# Patient Record
(demographics unavailable — no encounter records)

---

## 2024-11-18 NOTE — HISTORY OF PRESENT ILLNESS
[de-identified] : 62F h/o HTN, HLD, s/p AVR, presents now for evaluation of periumbilical bulge that she first noticed in March of this year. s/p previous lap hysterectomy and lap matues (2023), both using periumbilical incisions. Reports increasing discomfort at site. No episodes of incarceration or bowel obstructions. Pain is worse with straining and sitting up. No tobacco use. No chronic NSAID use.

## 2024-11-18 NOTE — ASSESSMENT
[FreeTextEntry1] : 62F h/o HTN, HLD, s/p AVR, now with periumbilical hernia within rectus diastasis s/p previous lap hysterectomy and mateus. Recommend robotic eTEP repair to address both concomitantly. Will need cardiac eval prior to surgery.

## 2024-11-18 NOTE — PHYSICAL EXAM
[JVD] : no jugular venous distention  [Normal Breath Sounds] : Normal breath sounds [Normal Heart Sounds] : normal heart sounds [Normal Rate and Rhythm] : normal rate and rhythm [No HSM] : no hepatosplenomegaly [No Rash or Lesion] : No rash or lesion [Alert] : alert [Oriented to Person] : oriented to person [Oriented to Place] : oriented to place [Oriented to Time] : oriented to time [Calm] : calm [de-identified] : well-appearing, NAD [de-identified] : TYE [de-identified] : soft, ND, NT, +BS, +reducible umbilical hernia defect within upper midline rectus diastasis.

## 2024-12-13 NOTE — HISTORY OF PRESENT ILLNESS
[FreeTextEntry1] : 62F with hx AVR (done in [facility]), HTN, HLD, DM, referred by Dr Goins to establish cardiovascular care.  Patient was assessed on her last visit for chest discomfort, she underwent CCTA and TTE, patient is here to review results, she reports improvement of symptoms.  Patient is planned for a hernia repair in the upcoming weeks.   Patient denies chest pain, no palpitations, no BOLIVAR, no PND, no orthopnea, no leg edema,  no claudication, no syncope.

## 2024-12-13 NOTE — ASSESSMENT
[FreeTextEntry1] : 62F with hx AVR (done in [facility]), HTN, HLD, DM, referred by Dr Goins to establish cardiovascular care.  Patient was assessed on her last visit for chest discomfort, she underwent CCTA and TTE, patient is here to review results, she reports improvement of symptoms.  Patient is planned for a hernia repair in the upcoming weeks.   Patient denies chest pain, no palpitations, no BOLIVAR, no PND, no orthopnea, no leg edema,  no claudication, no syncope.   #Atypical chest pain /CAD unlikely from cardiac etiology.  nonobstructive  will proceed with statin therapy   #AVR  normal function on echo  will continue yearly assessments with TTE  #Dilatation of ascending aorta will continue surveillance   #HTN/HLD/DM managed by PCP  #Preop patient is planned for a hernia repair, intermediate risk intervention  patient is at low -intermediate risk of MACE METs>4 Based on patient history, clinical risk and otherwise stable cardiac pattern, there is no absolute cardiac contraindication for her/him to undergo the proposed surgical procedure.   #Obesity  Diet and lifestyle modification discussed including low sodium, low fat and low carbohydrate weight reducing diet. Patient is to implement aerobic exercise regimen few days per week.  RTC 6 months

## 2024-12-13 NOTE — CARDIOLOGY SUMMARY
[de-identified] : 10/17/24: NSR 12/13/24: NSR, precordial TWI [de-identified] : TTE 11/2024: 1. Left ventricular systolic function is normal with an ejection fraction visually estimated at 70 to 75 %. There are no regional wall motion abnormalities seen. 2. Normal left ventricular diastolic function. 3. Normal right ventricular cavity size and normal right ventricular systolic function. 4. A bioprosthetic valve replacement valve replacement is present in the aortic position The prosthetic valve is well seated. Mild prosthetic aortic stenosis. No intravalvular regurgitation. 5. The peak transaortic velocity is 2.99 m/s, peak transaortic gradient is 35.8 mmHg and mean transaortic gradient is 19.0 mmHg with an LVOT/aortic valve VTI ratio of 0.52. The effective orifice area is estimated at 1.33 cm by the continuity equation. 6. Ascending aorta is normal in size, measuring 4.00 cm (indexed 2.22 cm/m). 7. No prior echocardiogram is available for comparison. [de-identified] : CCTA 12/2/24 Coronary artery calcium Agatston score: Left main (LM) coronary artery:  0 Left anterior descending (LAD) coronary artery:  12 Left circumflex (LCX) coronary artery:  0 Right coronary artery (RCA):  0 Total:  12 1.  Nonobstructive CAD. 2.  The calculated Agatston score is 12. The estimated probability of a non zero calcium score is 31%.The observed calcium score is at percentile 77 for subjects of the same age, gender, and race/ethnicity who are free of clinical cardiovascular disease and treated diabetes. 3.  CAC-DRS Score: A1/N1 4.  The ascending aorta at the level of the main pulmonary artery bifurcation is dilated (40.5mm).

## 2025-01-06 NOTE — HISTORY OF PRESENT ILLNESS
[de-identified] : 62F h/o HTN, HLD, s/p AVR, presents now for evaluation of periumbilical bulge that she first noticed in March of this year. s/p previous lap hysterectomy and lap mateus (2023), both using periumbilical incisions. Reports increasing discomfort at site. No episodes of incarceration or bowel obstructions. Pain is worse with straining and sitting up. No tobacco use. No chronic NSAID use. [de-identified] : Presents for routine postop follow up after robotic eTEP ventral hernia repair. Doing well. No fevers or chills. Pain resolved. No problems with incisions. Eating well, normal BMs.

## 2025-06-12 NOTE — CARDIOLOGY SUMMARY
[de-identified] : 10/17/24: NSR 12/13/24: NSR, precordial TWI 6/12/25: NSR [de-identified] : TTE 11/2024: 1. Left ventricular systolic function is normal with an ejection fraction visually estimated at 70 to 75 %. There are no regional wall motion abnormalities seen. 2. Normal left ventricular diastolic function. 3. Normal right ventricular cavity size and normal right ventricular systolic function. 4. A bioprosthetic valve replacement valve replacement is present in the aortic position The prosthetic valve is well seated. Mild prosthetic aortic stenosis. No intravalvular regurgitation. 5. The peak transaortic velocity is 2.99 m/s, peak transaortic gradient is 35.8 mmHg and mean transaortic gradient is 19.0 mmHg with an LVOT/aortic valve VTI ratio of 0.52. The effective orifice area is estimated at 1.33 cm by the continuity equation. 6. Ascending aorta is normal in size, measuring 4.00 cm (indexed 2.22 cm/m). 7. No prior echocardiogram is available for comparison. [de-identified] : CCTA 12/2/24 Coronary artery calcium Agatston score: Left main (LM) coronary artery:  0 Left anterior descending (LAD) coronary artery:  12 Left circumflex (LCX) coronary artery:  0 Right coronary artery (RCA):  0 Total:  12 1.  Nonobstructive CAD. 2.  The calculated Agatston score is 12. The estimated probability of a non zero calcium score is 31%.The observed calcium score is at percentile 77 for subjects of the same age, gender, and race/ethnicity who are free of clinical cardiovascular disease and treated diabetes. 3.  CAC-DRS Score: A1/N1 4.  The ascending aorta at the level of the main pulmonary artery bifurcation is dilated (40.5mm).

## 2025-06-12 NOTE — HISTORY OF PRESENT ILLNESS
[FreeTextEntry1] : 62F with hx Nonobstructive CAD, [facility] HTN, HLD, DM, referred by Dr Goins to establish cardiovascular care.  Reports that for the past few weeks left costal pain, constant, stabbing like, not related with exertion, non-radiation.   Patient denies chest pain, palpitations, BOLIVAR, PND, orthopnea, leg edema, claudication, no syncope.

## 2025-06-12 NOTE — REASON FOR VISIT
[Structural Heart and Valve Disease] : structural heart and valve disease [Hyperlipidemia] : hyperlipidemia [Hypertension] : hypertension [Coronary Artery Disease] : coronary artery disease [Carotid, Aortic and Peripheral Vascular Disease] : carotid, aortic and peripheral vascular disease [Symptom and Test Evaluation] : symptom and test evaluation

## 2025-06-12 NOTE — ASSESSMENT
[FreeTextEntry1] : 62F with hx Nonobstructive CAD, [facility] HTN, HLD, DM, referred by Dr Goins to establish cardiovascular care.  Reports that for the past few weeks left costal pain, constant, stabbing like, not related with exertion, non-radiation.   Patient denies chest pain, palpitations, BOLIVAR, PND, orthopnea, leg edema, claudication, no syncope.  #CAD nonanginal chest discomfort, patient reassured, advised to take over the counter pain killers.  nonobstructive lipids not at goal (, ) atorvastatin increased to 40mg qd labs ordered   #AVR  normal function on echo  will continue yearly assessments with TTE  #Dilatation of ascending aorta will continue surveillance on year basis   #HTN/HLD/DM managed by PCP   #Obesity  Diet and lifestyle modification discussed including low sodium, low fat and low carbohydrate weight reducing diet. Patient is to implement aerobic exercise regimen few days per week.  RTC 4 months